# Patient Record
Sex: FEMALE | Race: WHITE | NOT HISPANIC OR LATINO
[De-identification: names, ages, dates, MRNs, and addresses within clinical notes are randomized per-mention and may not be internally consistent; named-entity substitution may affect disease eponyms.]

---

## 2020-01-06 ENCOUNTER — APPOINTMENT (OUTPATIENT)
Dept: SURGERY | Facility: CLINIC | Age: 65
End: 2020-01-06
Payer: COMMERCIAL

## 2020-01-06 PROCEDURE — 99205K: CUSTOM

## 2020-06-05 PROBLEM — Z00.00 ENCOUNTER FOR PREVENTIVE HEALTH EXAMINATION: Status: ACTIVE | Noted: 2020-06-05

## 2020-07-28 ENCOUNTER — APPOINTMENT (OUTPATIENT)
Dept: OTOLARYNGOLOGY | Facility: CLINIC | Age: 65
End: 2020-07-28
Payer: COMMERCIAL

## 2020-07-28 VITALS
TEMPERATURE: 98.2 F | BODY MASS INDEX: 22.13 KG/M2 | RESPIRATION RATE: 16 BRPM | HEIGHT: 67 IN | SYSTOLIC BLOOD PRESSURE: 147 MMHG | HEART RATE: 88 BPM | DIASTOLIC BLOOD PRESSURE: 88 MMHG | WEIGHT: 141 LBS

## 2020-07-28 DIAGNOSIS — Z87.891 PERSONAL HISTORY OF NICOTINE DEPENDENCE: ICD-10-CM

## 2020-07-28 DIAGNOSIS — Z82.49 FAMILY HISTORY OF ISCHEMIC HEART DISEASE AND OTHER DISEASES OF THE CIRCULATORY SYSTEM: ICD-10-CM

## 2020-07-28 DIAGNOSIS — Z86.39 PERSONAL HISTORY OF OTHER ENDOCRINE, NUTRITIONAL AND METABOLIC DISEASE: ICD-10-CM

## 2020-07-28 DIAGNOSIS — Z85.828 PERSONAL HISTORY OF OTHER MALIGNANT NEOPLASM OF SKIN: ICD-10-CM

## 2020-07-28 DIAGNOSIS — Z87.39 PERSONAL HISTORY OF OTHER DISEASES OF THE MUSCULOSKELETAL SYSTEM AND CONNECTIVE TISSUE: ICD-10-CM

## 2020-07-28 DIAGNOSIS — Z80.3 FAMILY HISTORY OF MALIGNANT NEOPLASM OF BREAST: ICD-10-CM

## 2020-07-28 DIAGNOSIS — Z80.41 FAMILY HISTORY OF MALIGNANT NEOPLASM OF OVARY: ICD-10-CM

## 2020-07-28 DIAGNOSIS — Z78.9 OTHER SPECIFIED HEALTH STATUS: ICD-10-CM

## 2020-07-28 PROCEDURE — 99204 OFFICE O/P NEW MOD 45 MIN: CPT

## 2020-07-28 RX ORDER — FLUTICASONE PROPIONATE 50 MCG
SPRAY, SUSPENSION NASAL
Refills: 0 | Status: ACTIVE | COMMUNITY

## 2020-07-28 NOTE — CONSULT LETTER
[Dear  ___] : Dear  [unfilled], [DrAmadou  ___] : Dr. YARBROUGH [DrAmadou ___] : Dr. YARBROUGH [Courtesy Letter:] : I had the pleasure of seeing your patient, [unfilled], in my office today. [Please see my note below.] : Please see my note below. [FreeTextEntry2] : Micha Shirley MD  [Sincerely,] : Sincerely, [FreeTextEntry3] : Dank Sands MD

## 2020-07-28 NOTE — HISTORY OF PRESENT ILLNESS
[Neck Mass] : neck mass [Difficulty Swallowing] : no difficulty swallowing [de-identified] : Mrs. Corbett is 63 yo female who is being referred by Dr. Micha Shirley (PCP) for right parotid mass.\par May she noticed a swelling by her right ear and was given antibiotics with no relief and was referred to Dr. Braulio Robison (otolaryngologist) who did a work up. 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 Intermittent tinnitus, no pain in the ear, no hearing loss noticed.\par Denies recent cough, fever, chill, or changes in taste or smell \par \par First noted by pt in May. [Painful Swallowing] : no painful swallowing [Fever] : no fever

## 2020-07-28 NOTE — PHYSICAL EXAM
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  no palp nodes [Midline] : trachea located in midline position [Normal] : no rashes

## 2020-09-03 ENCOUNTER — OUTPATIENT (OUTPATIENT)
Dept: OUTPATIENT SERVICES | Facility: HOSPITAL | Age: 65
LOS: 1 days | End: 2020-09-03
Payer: COMMERCIAL

## 2020-09-03 VITALS
DIASTOLIC BLOOD PRESSURE: 88 MMHG | RESPIRATION RATE: 16 BRPM | HEIGHT: 66 IN | HEART RATE: 83 BPM | WEIGHT: 145.06 LBS | SYSTOLIC BLOOD PRESSURE: 140 MMHG | TEMPERATURE: 97 F | OXYGEN SATURATION: 98 %

## 2020-09-03 DIAGNOSIS — H43.819 VITREOUS DEGENERATION, UNSPECIFIED EYE: Chronic | ICD-10-CM

## 2020-09-03 DIAGNOSIS — C44.92 SQUAMOUS CELL CARCINOMA OF SKIN, UNSPECIFIED: Chronic | ICD-10-CM

## 2020-09-03 DIAGNOSIS — B02.9 ZOSTER WITHOUT COMPLICATIONS: Chronic | ICD-10-CM

## 2020-09-03 DIAGNOSIS — D22.9 MELANOCYTIC NEVI, UNSPECIFIED: Chronic | ICD-10-CM

## 2020-09-03 DIAGNOSIS — D49.0 NEOPLASM OF UNSPECIFIED BEHAVIOR OF DIGESTIVE SYSTEM: ICD-10-CM

## 2020-09-03 DIAGNOSIS — Z98.890 OTHER SPECIFIED POSTPROCEDURAL STATES: Chronic | ICD-10-CM

## 2020-09-03 LAB
ALBUMIN SERPL ELPH-MCNC: 4.8 G/DL — SIGNIFICANT CHANGE UP (ref 3.3–5)
ALP SERPL-CCNC: 62 U/L — SIGNIFICANT CHANGE UP (ref 40–120)
ALT FLD-CCNC: 17 U/L — SIGNIFICANT CHANGE UP (ref 4–33)
ANION GAP SERPL CALC-SCNC: 16 MMO/L — HIGH (ref 7–14)
AST SERPL-CCNC: 18 U/L — SIGNIFICANT CHANGE UP (ref 4–32)
BILIRUB SERPL-MCNC: 0.4 MG/DL — SIGNIFICANT CHANGE UP (ref 0.2–1.2)
BUN SERPL-MCNC: 15 MG/DL — SIGNIFICANT CHANGE UP (ref 7–23)
CALCIUM SERPL-MCNC: 9.9 MG/DL — SIGNIFICANT CHANGE UP (ref 8.4–10.5)
CHLORIDE SERPL-SCNC: 100 MMOL/L — SIGNIFICANT CHANGE UP (ref 98–107)
CO2 SERPL-SCNC: 24 MMOL/L — SIGNIFICANT CHANGE UP (ref 22–31)
CREAT SERPL-MCNC: 0.71 MG/DL — SIGNIFICANT CHANGE UP (ref 0.5–1.3)
GLUCOSE SERPL-MCNC: 81 MG/DL — SIGNIFICANT CHANGE UP (ref 70–99)
HCT VFR BLD CALC: 41.7 % — SIGNIFICANT CHANGE UP (ref 34.5–45)
HGB BLD-MCNC: 13.5 G/DL — SIGNIFICANT CHANGE UP (ref 11.5–15.5)
MCHC RBC-ENTMCNC: 29.6 PG — SIGNIFICANT CHANGE UP (ref 27–34)
MCHC RBC-ENTMCNC: 32.4 % — SIGNIFICANT CHANGE UP (ref 32–36)
MCV RBC AUTO: 91.4 FL — SIGNIFICANT CHANGE UP (ref 80–100)
NRBC # FLD: 0 K/UL — SIGNIFICANT CHANGE UP (ref 0–0)
PLATELET # BLD AUTO: 249 K/UL — SIGNIFICANT CHANGE UP (ref 150–400)
PMV BLD: 10.8 FL — SIGNIFICANT CHANGE UP (ref 7–13)
POTASSIUM SERPL-MCNC: 4.4 MMOL/L — SIGNIFICANT CHANGE UP (ref 3.5–5.3)
POTASSIUM SERPL-SCNC: 4.4 MMOL/L — SIGNIFICANT CHANGE UP (ref 3.5–5.3)
PROT SERPL-MCNC: 7.7 G/DL — SIGNIFICANT CHANGE UP (ref 6–8.3)
RBC # BLD: 4.56 M/UL — SIGNIFICANT CHANGE UP (ref 3.8–5.2)
RBC # FLD: 13.6 % — SIGNIFICANT CHANGE UP (ref 10.3–14.5)
SODIUM SERPL-SCNC: 140 MMOL/L — SIGNIFICANT CHANGE UP (ref 135–145)
WBC # BLD: 9.88 K/UL — SIGNIFICANT CHANGE UP (ref 3.8–10.5)
WBC # FLD AUTO: 9.88 K/UL — SIGNIFICANT CHANGE UP (ref 3.8–10.5)

## 2020-09-03 PROCEDURE — 93010 ELECTROCARDIOGRAM REPORT: CPT

## 2020-09-03 RX ORDER — SODIUM CHLORIDE 9 MG/ML
1000 INJECTION, SOLUTION INTRAVENOUS
Refills: 0 | Status: DISCONTINUED | OUTPATIENT
Start: 2020-09-11 | End: 2020-09-25

## 2020-09-03 RX ORDER — ALPRAZOLAM 0.25 MG
0 TABLET ORAL
Qty: 0 | Refills: 0 | DISCHARGE

## 2020-09-03 RX ORDER — CHOLECALCIFEROL (VITAMIN D3) 125 MCG
0 CAPSULE ORAL
Qty: 0 | Refills: 0 | DISCHARGE

## 2020-09-03 NOTE — H&P PST ADULT - NEGATIVE GENERAL SYMPTOMS
Patient has not yet had his repeat study done so we will help to expedite that today  no weight loss/no chills/no sweating/no anorexia/no weight gain/no fever

## 2020-09-03 NOTE — H&P PST ADULT - NSICDXPASTSURGICALHX_GEN_ALL_CORE_FT
PAST SURGICAL HISTORY:  Melanocytic nevus Excision Back 2015    S/P left breast biopsy " benign "    Shingles 2012    Squamous cell skin cancer s/p Excision Right Thigh    Vitreous detachment

## 2020-09-03 NOTE — H&P PST ADULT - NSICDXPASTMEDICALHX_GEN_ALL_CORE_FT
PAST MEDICAL HISTORY:  Anxiety     Arthritis Left Hip    History of colitis 40 years ago    Squamous cell skin cancer Right thigh    Vitreous detachment, right 3/18 ; pt denies surgical intervention

## 2020-09-03 NOTE — H&P PST ADULT - NSICDXPROBLEM_GEN_ALL_CORE_FT
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

## 2020-09-03 NOTE — H&P PST ADULT - HISTORY OF PRESENT ILLNESS
Pt is a 65 y.o. female ; pt reports swelling right parotid gland first noted 4/2020. Pt to PCP ; tx with Abx ; Pt is a 65 y.o. female ; pt reports swelling right parotid gland first noted 4/2020. Pt to PCP ; tx with Abx ; pt referred to surgeon :s/p C/T scan ; s/p Biopsy ; pt now presents for Right Parotidectomy

## 2020-09-06 DIAGNOSIS — Z01.818 ENCOUNTER FOR OTHER PREPROCEDURAL EXAMINATION: ICD-10-CM

## 2020-09-08 ENCOUNTER — APPOINTMENT (OUTPATIENT)
Dept: DISASTER EMERGENCY | Facility: CLINIC | Age: 65
End: 2020-09-08

## 2020-09-08 LAB — SARS-COV-2 N GENE NPH QL NAA+PROBE: NOT DETECTED

## 2020-09-10 ENCOUNTER — TRANSCRIPTION ENCOUNTER (OUTPATIENT)
Age: 65
End: 2020-09-10

## 2020-09-10 NOTE — ASU PATIENT PROFILE, ADULT - PSH
Melanocytic nevus  Excision Back 2015  S/P left breast biopsy  " benign "  Shingles  2012  Squamous cell skin cancer  s/p Excision Right Thigh  Vitreous detachment

## 2020-09-10 NOTE — ASU PATIENT PROFILE, ADULT - PT NEEDS ASSIST
Patient did not come to the lab to have test(s) drawn. Orders were canceled placed as future.   1 = assistive equipment no 3 = assistive equipment and person

## 2020-09-11 ENCOUNTER — OUTPATIENT (OUTPATIENT)
Dept: OUTPATIENT SERVICES | Facility: HOSPITAL | Age: 65
LOS: 1 days | Discharge: HOME | End: 2020-09-11
Payer: COMMERCIAL

## 2020-09-11 ENCOUNTER — APPOINTMENT (OUTPATIENT)
Dept: OTOLARYNGOLOGY | Facility: HOSPITAL | Age: 65
End: 2020-09-11

## 2020-09-11 ENCOUNTER — RESULT REVIEW (OUTPATIENT)
Age: 65
End: 2020-09-11

## 2020-09-11 VITALS
RESPIRATION RATE: 18 BRPM | TEMPERATURE: 97 F | SYSTOLIC BLOOD PRESSURE: 145 MMHG | DIASTOLIC BLOOD PRESSURE: 73 MMHG | OXYGEN SATURATION: 97 % | HEART RATE: 78 BPM

## 2020-09-11 VITALS
SYSTOLIC BLOOD PRESSURE: 156 MMHG | WEIGHT: 145.06 LBS | TEMPERATURE: 99 F | HEIGHT: 66 IN | OXYGEN SATURATION: 100 % | RESPIRATION RATE: 16 BRPM | DIASTOLIC BLOOD PRESSURE: 91 MMHG | HEART RATE: 84 BPM

## 2020-09-11 DIAGNOSIS — B02.9 ZOSTER WITHOUT COMPLICATIONS: Chronic | ICD-10-CM

## 2020-09-11 DIAGNOSIS — H43.819 VITREOUS DEGENERATION, UNSPECIFIED EYE: Chronic | ICD-10-CM

## 2020-09-11 DIAGNOSIS — Z98.890 OTHER SPECIFIED POSTPROCEDURAL STATES: Chronic | ICD-10-CM

## 2020-09-11 DIAGNOSIS — C44.92 SQUAMOUS CELL CARCINOMA OF SKIN, UNSPECIFIED: Chronic | ICD-10-CM

## 2020-09-11 DIAGNOSIS — D49.0 NEOPLASM OF UNSPECIFIED BEHAVIOR OF DIGESTIVE SYSTEM: ICD-10-CM

## 2020-09-11 DIAGNOSIS — D22.9 MELANOCYTIC NEVI, UNSPECIFIED: Chronic | ICD-10-CM

## 2020-09-11 PROCEDURE — 88331 PATH CONSLTJ SURG 1 BLK 1SPC: CPT | Mod: 26

## 2020-09-11 PROCEDURE — 42415 EXCISE PAROTID GLAND/LESION: CPT | Mod: GC,RT

## 2020-09-11 PROCEDURE — 88307 TISSUE EXAM BY PATHOLOGIST: CPT | Mod: 26

## 2020-09-11 RX ORDER — FLUTICASONE PROPIONATE 50 MCG
0 SPRAY, SUSPENSION NASAL
Qty: 0 | Refills: 0 | DISCHARGE

## 2020-09-11 RX ORDER — PREGABALIN 225 MG/1
0 CAPSULE ORAL
Qty: 0 | Refills: 0 | DISCHARGE

## 2020-09-11 RX ORDER — ALPRAZOLAM 0.25 MG
0.25 TABLET ORAL
Qty: 0 | Refills: 0 | DISCHARGE

## 2020-09-11 RX ORDER — CEPHALEXIN 500 MG
1 CAPSULE ORAL
Qty: 21 | Refills: 0
Start: 2020-09-11 | End: 2020-09-17

## 2020-09-11 RX ORDER — FENTANYL CITRATE 50 UG/ML
50 INJECTION INTRAVENOUS
Refills: 0 | Status: DISCONTINUED | OUTPATIENT
Start: 2020-09-11 | End: 2020-09-11

## 2020-09-11 RX ORDER — OXYCODONE HYDROCHLORIDE 5 MG/1
1 TABLET ORAL
Qty: 10 | Refills: 0
Start: 2020-09-11

## 2020-09-11 RX ORDER — ONDANSETRON 8 MG/1
4 TABLET, FILM COATED ORAL ONCE
Refills: 0 | Status: DISCONTINUED | OUTPATIENT
Start: 2020-09-11 | End: 2020-09-25

## 2020-09-11 RX ORDER — FENTANYL CITRATE 50 UG/ML
25 INJECTION INTRAVENOUS
Refills: 0 | Status: DISCONTINUED | OUTPATIENT
Start: 2020-09-11 | End: 2020-09-11

## 2020-09-11 RX ORDER — MULTIVIT-MIN/FERROUS GLUCONATE 9 MG/15 ML
1 LIQUID (ML) ORAL
Qty: 0 | Refills: 0 | DISCHARGE

## 2020-09-11 RX ORDER — PSYLLIUM SEED (WITH DEXTROSE)
0 POWDER (GRAM) ORAL
Qty: 0 | Refills: 0 | DISCHARGE

## 2020-09-11 RX ORDER — CHOLECALCIFEROL (VITAMIN D3) 125 MCG
0 CAPSULE ORAL
Qty: 0 | Refills: 0 | DISCHARGE

## 2020-09-11 NOTE — ASU DISCHARGE PLAN (ADULT/PEDIATRIC) - NURSING INSTRUCTIONS
No creams, lotions, powders  or ointments to incision site. Narcotic pain medication may cause nausea or constipation. Take medication with food. Increase fluids and fiber intake. Cool and warm liquids that are not irritating to the throat should be given for the first day or two. Avoid hot liquids. Avoid citrus juices and milk. Advance at your own pace starting with soft foods and advancing to a regular diet. Avoid rough and scratchy foods and foods that are difficult to chew for approximately 5 days. Avoid strenous exercise and blowing of nose. Make appointment with MD.

## 2020-09-11 NOTE — ASU DISCHARGE PLAN (ADULT/PEDIATRIC) - ASU DC SPECIAL INSTRUCTIONSFT
keflex three times a day  oxycodone for pain if OTC pain meds are not enough  Drain SAMINA and record output    We will call you for you for your follow up appointment., Monday or Tuesday

## 2020-09-11 NOTE — ASU DISCHARGE PLAN (ADULT/PEDIATRIC) - CALL YOUR DOCTOR IF YOU HAVE ANY OF THE FOLLOWING:
Unable to urinate/Fever greater than (need to indicate Fahrenheit or Celsius)/Bleeding that does not stop/Pain not relieved by Medications/Nausea and vomiting that does not stop/Swelling that gets worse/Wound/Surgical Site with redness, or foul smelling discharge or pus

## 2020-09-11 NOTE — ASU DISCHARGE PLAN (ADULT/PEDIATRIC) - CARE PROVIDER_API CALL
Dank Sands)  [facility]; Otolaryngology  [address] [place] [address]  Phone: [phone]  Fax: [phone]  Follow Up Time:

## 2020-09-11 NOTE — BRIEF OPERATIVE NOTE - OPERATION/FINDINGS
~3cm parotid mass in deep parotid lobe  FN identified and dissected off the gland without difficulty  Overall benign appearance of mass, no infiltration of surrounding tissue    Frozen path: low grade neoplasm, unable to determine if benign or malignant

## 2020-09-15 ENCOUNTER — APPOINTMENT (OUTPATIENT)
Dept: OTOLARYNGOLOGY | Facility: CLINIC | Age: 65
End: 2020-09-15
Payer: COMMERCIAL

## 2020-09-15 VITALS
TEMPERATURE: 99.1 F | DIASTOLIC BLOOD PRESSURE: 97 MMHG | BODY MASS INDEX: 22.13 KG/M2 | WEIGHT: 141 LBS | HEIGHT: 67 IN | SYSTOLIC BLOOD PRESSURE: 142 MMHG | HEART RATE: 101 BPM

## 2020-09-15 PROCEDURE — 99024 POSTOP FOLLOW-UP VISIT: CPT

## 2020-09-15 NOTE — REASON FOR VISIT
[Post-Operative Visit] : a post-operative visit [FreeTextEntry2] : S/p excision of RIGHT parotid tumor on 9/11/2020

## 2020-09-15 NOTE — HISTORY OF PRESENT ILLNESS
[None] : No associated symptoms are reported. [de-identified] : Mrs. Corbett is a 64 yo female S/p excision of RIGHT parotid tumor on 9/11/2020. Presents today for Moustapha Lowe drain removal. Report over night drain put out less than 10 cc of serosanguineous drain. report feeling well.\par Denies pain, dysphagia, dysphonia and or dyspnea Denies recent cough, fever, chill, or changes in taste or smell

## 2020-09-17 ENCOUNTER — APPOINTMENT (OUTPATIENT)
Dept: OTOLARYNGOLOGY | Facility: CLINIC | Age: 65
End: 2020-09-17
Payer: COMMERCIAL

## 2020-09-17 LAB — SURGICAL PATHOLOGY STUDY: SIGNIFICANT CHANGE UP

## 2020-09-17 PROCEDURE — 99024 POSTOP FOLLOW-UP VISIT: CPT

## 2020-09-17 NOTE — CONSULT LETTER
[Dear  ___] : Dear  [unfilled], [Courtesy Letter:] : I had the pleasure of seeing your patient, [unfilled], in my office today. [Please see my note below.] : Please see my note below. [Sincerely,] : Sincerely, [DrAmadou  ___] : Dr. YARBROUGH [DrAmadou ___] : Dr. YARBROUGH [FreeTextEntry2] : Micha Shirley MD   [FreeTextEntry3] : Renae Oconnell NP\par Dr. Dank Sands\par Head and Neck Surgery\par [facility]\par [address]\[place] [address]\par Tel: [phone]\par \par

## 2020-09-17 NOTE — HISTORY OF PRESENT ILLNESS
[None] : The patient is currently asymptomatic. [de-identified] : Mrs. Corbett is a 64 yo female S/p excision of RIGHT parotid tumor on 9/11/2020. Presents today for surgical incision assessment and suture removal. Reports feeling well. Finishing antibiotic (cephalexin) given when discharged from hospital.Denies pain, dysphagia, dysphonia and or dyspnea Denies recent cough, fever, chill, or changes in taste or smell \par \par final path ce benign process [Difficulty Swallowing] : no difficulty swallowing [Neck Pain] : no neck pain [Fever] : no fever [Painful Swallowing] : no painful swallowing [Hoarseness] : no hoarseness

## 2020-09-17 NOTE — PHYSICAL EXAM
[de-identified] : right neck incision well approximated. Mild redness around incision noted where steri strips were. No discharge , warmth or redness on the incision

## 2020-09-18 PROBLEM — F41.9 ANXIETY DISORDER, UNSPECIFIED: Chronic | Status: ACTIVE | Noted: 2020-09-03

## 2020-09-18 PROBLEM — Z87.19 PERSONAL HISTORY OF OTHER DISEASES OF THE DIGESTIVE SYSTEM: Chronic | Status: ACTIVE | Noted: 2020-09-03

## 2020-09-18 PROBLEM — C44.92 SQUAMOUS CELL CARCINOMA OF SKIN, UNSPECIFIED: Chronic | Status: ACTIVE | Noted: 2020-09-03

## 2020-09-18 PROBLEM — M19.90 UNSPECIFIED OSTEOARTHRITIS, UNSPECIFIED SITE: Chronic | Status: ACTIVE | Noted: 2020-09-03

## 2020-09-18 PROBLEM — H43.811 VITREOUS DEGENERATION, RIGHT EYE: Chronic | Status: ACTIVE | Noted: 2020-09-03

## 2020-10-27 ENCOUNTER — APPOINTMENT (OUTPATIENT)
Dept: OTOLARYNGOLOGY | Facility: CLINIC | Age: 65
End: 2020-10-27
Payer: COMMERCIAL

## 2020-10-27 VITALS
DIASTOLIC BLOOD PRESSURE: 80 MMHG | WEIGHT: 144 LBS | HEART RATE: 80 BPM | SYSTOLIC BLOOD PRESSURE: 136 MMHG | HEIGHT: 67 IN | BODY MASS INDEX: 22.6 KG/M2

## 2020-10-27 PROCEDURE — 99024 POSTOP FOLLOW-UP VISIT: CPT

## 2020-10-27 NOTE — HISTORY OF PRESENT ILLNESS
[de-identified] : 65 year old female follow up 9/11 s/p excision of right parotid tumor. Pathology pleomorphic adenoma. Pt denies pain, numbness at site.

## 2020-12-03 ENCOUNTER — TRANSCRIPTION ENCOUNTER (OUTPATIENT)
Age: 65
End: 2020-12-03

## 2020-12-10 ENCOUNTER — TRANSCRIPTION ENCOUNTER (OUTPATIENT)
Age: 65
End: 2020-12-10

## 2021-01-03 ENCOUNTER — TRANSCRIPTION ENCOUNTER (OUTPATIENT)
Age: 66
End: 2021-01-03

## 2021-07-11 ENCOUNTER — TRANSCRIPTION ENCOUNTER (OUTPATIENT)
Age: 66
End: 2021-07-11

## 2021-07-28 ENCOUNTER — TRANSCRIPTION ENCOUNTER (OUTPATIENT)
Age: 66
End: 2021-07-28

## 2021-11-22 ENCOUNTER — TRANSCRIPTION ENCOUNTER (OUTPATIENT)
Age: 66
End: 2021-11-22

## 2021-11-30 ENCOUNTER — APPOINTMENT (OUTPATIENT)
Dept: OTOLARYNGOLOGY | Facility: CLINIC | Age: 66
End: 2021-11-30
Payer: COMMERCIAL

## 2021-11-30 VITALS
WEIGHT: 154 LBS | DIASTOLIC BLOOD PRESSURE: 86 MMHG | BODY MASS INDEX: 24.17 KG/M2 | HEIGHT: 67 IN | SYSTOLIC BLOOD PRESSURE: 123 MMHG | HEART RATE: 76 BPM

## 2021-11-30 DIAGNOSIS — D49.0 NEOPLASM OF UNSPECIFIED BEHAVIOR OF DIGESTIVE SYSTEM: ICD-10-CM

## 2021-11-30 PROCEDURE — 99213 OFFICE O/P EST LOW 20 MIN: CPT

## 2021-11-30 RX ORDER — ERYTHROMYCIN 5 MG/G
5 OINTMENT OPHTHALMIC
Qty: 3 | Refills: 0 | Status: COMPLETED | COMMUNITY
Start: 2021-07-11

## 2021-11-30 RX ORDER — ALPRAZOLAM 0.25 MG/1
0.25 TABLET ORAL
Qty: 60 | Refills: 0 | Status: ACTIVE | COMMUNITY
Start: 2021-10-07

## 2021-11-30 RX ORDER — METOPROLOL SUCCINATE 25 MG/1
25 TABLET, EXTENDED RELEASE ORAL
Qty: 90 | Refills: 0 | Status: ACTIVE | COMMUNITY
Start: 2021-05-27

## 2021-11-30 NOTE — CONSULT LETTER
[Dear  ___] : Dear  [unfilled], [Courtesy Letter:] : I had the pleasure of seeing your patient, [unfilled], in my office today. [Please see my note below.] : Please see my note below. [Sincerely,] : Sincerely, [DrAmadou  ___] : Dr. YARBROUGH [DrAmadou ___] : Dr. YARBROUGH [FreeTextEntry2] : Micha Shirley MD   [FreeTextEntry3] : Dank Sands MD, FACS\par \par [facility]\par Associate Chair\par Department of Otolaryngology\par Professor\par Otolaryngology & Molecular Medicine\par [employer] [facility]

## 2021-11-30 NOTE — REASON FOR VISIT
[Subsequent Evaluation] : a subsequent evaluation for [FreeTextEntry2] : follow-up s/p excision of RIGHT parotid tumor on 9/11/2020

## 2021-11-30 NOTE — HISTORY OF PRESENT ILLNESS
[None] : The patient is currently asymptomatic. [de-identified] : 66 year female presents for follow-up s/p excision of RIGHT parotid tumor on 9/11/2020. Final path cw benign process-mixed tumor. Reports that she feels like she has to clear her throat frequently, better since surgery. Reports slight numbness around the right ear. Denies pain, dysphagia, odynophagia, dysphonia, dyspnea or otalgia. Denies recent fevers, infections, changes in taste or smell.\par COVID vaccination (Pfizer) 2nd dose completed 05/2021.\par  [Difficulty Swallowing] : no difficulty swallowing [Neck Pain] : no neck pain [Fever] : no fever [Painful Swallowing] : no painful swallowing [Hoarseness] : no hoarseness

## 2021-12-21 ENCOUNTER — TRANSCRIPTION ENCOUNTER (OUTPATIENT)
Age: 66
End: 2021-12-21

## 2022-08-18 ENCOUNTER — NON-APPOINTMENT (OUTPATIENT)
Age: 67
End: 2022-08-18

## 2022-12-02 ENCOUNTER — APPOINTMENT (OUTPATIENT)
Dept: ORTHOPEDIC SURGERY | Facility: CLINIC | Age: 67
End: 2022-12-02

## 2022-12-02 VITALS — WEIGHT: 154 LBS | HEIGHT: 67 IN | BODY MASS INDEX: 24.17 KG/M2

## 2022-12-02 DIAGNOSIS — M25.511 PAIN IN RIGHT SHOULDER: ICD-10-CM

## 2022-12-02 PROCEDURE — 99203 OFFICE O/P NEW LOW 30 MIN: CPT

## 2022-12-02 PROCEDURE — 73030 X-RAY EXAM OF SHOULDER: CPT | Mod: RT

## 2022-12-02 PROCEDURE — 73010 X-RAY EXAM OF SHOULDER BLADE: CPT | Mod: RT

## 2022-12-02 NOTE — PHYSICAL EXAM
[Extension] : extension [Right] : right shoulder [Calcific density] : Calcific density [There are no fractures, subluxations or dislocations. No significant abnormalities are seen] : There are no fractures, subluxations or dislocations. No significant abnormalities are seen [] : negative impingement testing

## 2022-12-02 NOTE — HISTORY OF PRESENT ILLNESS
[Gradual] : gradual [3] : 3 [Dull/Aching] : dull/aching [Localized] : localized [Constant] : constant [Heat] : heat [de-identified] : Having pain behind right shoulder by shoulder blade. Pain is more of an ache, doesn't stop her from any activity. No neck pain or numbness in arm. No night pain [] : no [FreeTextEntry1] : rt shoulder [FreeTextEntry3] : 2 months  [FreeTextEntry5] : pt stated she has been having shoulder pain for two months with nni

## 2023-08-20 ENCOUNTER — NON-APPOINTMENT (OUTPATIENT)
Age: 68
End: 2023-08-20

## 2023-10-24 NOTE — ASU PATIENT PROFILE, ADULT - PMH
left ankle pain sine fall yesterday
Anxiety    Arthritis  Left Hip  History of colitis  40 years ago  Squamous cell skin cancer  Right thigh  Vitreous detachment, right  3/18 ; pt denies surgical intervention

## 2024-01-30 NOTE — H&P PST ADULT - BP NONINVASIVE DIASTOLIC (MM HG)
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 Cheek flap viable. Sutures not ready for removal. Follow up in 3 weeks for suture removal. 89

## 2024-05-06 ENCOUNTER — APPOINTMENT (OUTPATIENT)
Dept: ORTHOPEDIC SURGERY | Facility: CLINIC | Age: 69
End: 2024-05-06
Payer: MEDICARE

## 2024-05-06 VITALS — BODY MASS INDEX: 24.64 KG/M2 | WEIGHT: 157 LBS | HEIGHT: 67 IN

## 2024-05-06 PROCEDURE — 99214 OFFICE O/P EST MOD 30 MIN: CPT

## 2024-05-06 PROCEDURE — 72040 X-RAY EXAM NECK SPINE 2-3 VW: CPT

## 2024-05-06 PROCEDURE — 73030 X-RAY EXAM OF SHOULDER: CPT | Mod: LT

## 2024-05-06 PROCEDURE — 73010 X-RAY EXAM OF SHOULDER BLADE: CPT | Mod: LT

## 2024-05-06 NOTE — IMAGING
[de-identified] : Cervical Spine:  No swelling, no ecchymosis Trapezial and paracervical tenderness to palpation Diminished range of motion in all planes 5/5 deltoid, biceps, triceps and wrist flexors/extensors Negative spurling, negative panda Reflexes +2, intact motor distally NVID  Left shoulder No swelling, no ecchymosis, no pablo deformity Tenderness to palpation over greater tuberosity No instability or tenderness over AC joint Active Forward Flexion 180 Passive FF: 180; ER: 90: IR: 70; ER at the side 50 5/5 supraspinatus, infraspinatus and subscapularis; there is mild pain with strength testing Positive Chávez test, positive impingement sign Speeds and yergason negative Motor and sensory intact distally [Straightening consistent with spasm] : Straightening consistent with spasm [Disc space narrowing] : Disc space narrowing [Left] : left shoulder [There are no fractures, subluxations or dislocations. No significant abnormalities are seen] : There are no fractures, subluxations or dislocations. No significant abnormalities are seen

## 2024-05-06 NOTE — HISTORY OF PRESENT ILLNESS
[1] : 2 [Intermittent] : intermittent [de-identified] : 5.6.24 Patient here for left shoulder pain. Pain started a month ago but subsided. Last week she started working out and then pain started again. Yesterday the neck stared hurting [] : no [FreeTextEntry6] : soreness

## 2024-05-06 NOTE — ASSESSMENT
[FreeTextEntry1] : Exacerbation cervical degenerative disc disease as well as rotator cuff irritation from working out.  She since has resumed working out 2 months ago and thinks this is what triggered it.  No focal neurological deficits on weakness on exam today.  We discussed a comprehensive treatment plan including course of formal physical therapy and activity modification.  Prescription meloxicam sent to the pharmacy.  She will follow-up in 6 weeks time and if still symptomatic we discussed MRI of the neck and or shoulder.  Although expect good outcome prognosis is uncertain at this time if symptoms persist may need further workup.   The patient's current medication management of their orthopedic diagnosis was reviewed today: (1) We discussed a comprehensive treatment plan that included pharmaceutical management involving the use of prescription medications. (2) There is a moderate risk of morbidity with further treatment, especially from use of prescription strength medications and possible side effects of these medications which include upset stomach with oral medications, skin reactions to topical medications and cardiac/renal/diabetes issues with long term use. (3) I recommended that the patient follow-up with their medical physician to discuss any significant specific potential issues with long term medication use such as interactions with current medications or with exacerbation of underlying medical comorbidities. (4) The benefits and risks associated with use of injectable, oral or topical, prescription and over the counter anti-inflammatory medications were discussed with the patient. The patient voiced understanding of the risks including but not limited to bleeding, stroke, kidney dysfunction, heart disease, and were referred to the black box warning label for further information.

## 2024-05-10 RX ORDER — MELOXICAM 15 MG/1
15 TABLET ORAL DAILY
Qty: 30 | Refills: 0 | Status: DISCONTINUED | COMMUNITY
Start: 2024-05-06 | End: 2024-05-10

## 2024-05-10 RX ORDER — METHYLPREDNISOLONE 4 MG/1
4 TABLET ORAL
Qty: 1 | Refills: 0 | Status: ACTIVE | COMMUNITY
Start: 2024-05-10 | End: 1900-01-01

## 2024-06-17 ENCOUNTER — APPOINTMENT (OUTPATIENT)
Dept: ORTHOPEDIC SURGERY | Facility: CLINIC | Age: 69
End: 2024-06-17
Payer: MEDICARE

## 2024-06-17 VITALS — HEIGHT: 67 IN | BODY MASS INDEX: 24.8 KG/M2 | WEIGHT: 158 LBS

## 2024-06-17 DIAGNOSIS — S43.422A SPRAIN OF LEFT ROTATOR CUFF CAPSULE, INITIAL ENCOUNTER: ICD-10-CM

## 2024-06-17 DIAGNOSIS — S13.9XXA SPRAIN OF JOINTS AND LIGAMENTS OF UNSPECIFIED PARTS OF NECK, INITIAL ENCOUNTER: ICD-10-CM

## 2024-06-17 PROCEDURE — 99213 OFFICE O/P EST LOW 20 MIN: CPT

## 2024-06-17 NOTE — IMAGING
[de-identified] : Cervical Spine:   No swelling, no ecchymosis Trapezial and paracervical tenderness to palpation Full ROM without pain  5/5 deltoid, biceps, triceps and wrist flexors/extensors Negative spurling, negative panda Reflexes +2, intact motor distally NVID  Left shoulder  No swelling, no ecchymosis, no pablo deformity Tenderness to palpation over greater tuberosity No instability or tenderness over AC joint Active Forward Flexion 180 Passive FF: 180; ER: 90: IR: 70; ER at the side 50 4/5 supraspinatus, infraspinatus and subscapularis; there is mild pain with strength testing Positive Chávez test, positive impingement sign Speeds and yergason negative Motor and sensory intact distally

## 2024-06-17 NOTE — HISTORY OF PRESENT ILLNESS
[1] : 2 [Intermittent] : intermittent [de-identified] : 5.6.24 Patient here for left shoulder pain. Pain started a month ago but subsided. Last week she started working out and then pain started again. Yesterday the neck stared hurting  6.17.24 Patient here for left shoulder pain. Patient states pain improved since last visit, states physical therapy is working.  [] : no [FreeTextEntry6] : soreness

## 2024-06-17 NOTE — ASSESSMENT
[FreeTextEntry1] : Clinically improving with physical therapy. Residual weakness on the left side, but improved since last visits. Advised to continue with physical therapy. She is not needing meloxicam at this time, but discussed use as needed. RTC 6 weeks, if symptoms still persist will evaluate integrity of rotator cuff with MRI of left shoulder.  Progress note completed by Amarilys Cook PA-C under the supervision of Dr. Pinzon

## 2024-06-20 ENCOUNTER — RESULT REVIEW (OUTPATIENT)
Age: 69
End: 2024-06-20

## 2024-07-22 ENCOUNTER — APPOINTMENT (OUTPATIENT)
Dept: ORTHOPEDIC SURGERY | Facility: CLINIC | Age: 69
End: 2024-07-22
Payer: MEDICARE

## 2024-07-22 DIAGNOSIS — S43.422A SPRAIN OF LEFT ROTATOR CUFF CAPSULE, INITIAL ENCOUNTER: ICD-10-CM

## 2024-07-22 DIAGNOSIS — M54.2 CERVICALGIA: ICD-10-CM

## 2024-07-22 DIAGNOSIS — M75.112 INCOMPLETE ROTATOR CUFF TEAR OR RUPTURE OF LEFT SHOULDER, NOT SPECIFIED AS TRAUMATIC: ICD-10-CM

## 2024-07-22 PROCEDURE — 99213 OFFICE O/P EST LOW 20 MIN: CPT

## 2024-07-22 NOTE — ASSESSMENT
[FreeTextEntry1] : Reviewed left shoulder MRI in detail.  Partial bursal sided tearing relatively small in the background of chronic tendinopathy.  We reviewed these findings in detail.  Mostly degenerative and related to lifting and exercising.  Long discussion about expectations and continued need for physical therapy and eventually transition to home exercise program.  She is not interested in a corticosteroid injection symptoms are mild at this time.  She will follow-up as needed.

## 2024-07-22 NOTE — IMAGING
[de-identified] : Shoulder Exam Inspection: No swelling, no ecchymosis, no pablo deformity Palpation: Tenderness to palpation over greater tuberosity Stability: No instability or tenderness over AC joint Range of Motion: Active Forward Flexion 180 Passive FF: 180; ER: 90: IR: 70; ER at the side 50 Strength: 5/5 supraspinatus, infraspinatus and subscapularis; there is pain with strength testing Special testing: Positive Chávez test, positive impingement sign; Speeds and yergason negative Neuro: Motor and sensory intact distally

## 2024-07-22 NOTE — HISTORY OF PRESENT ILLNESS
[1] : 2 [Intermittent] : intermittent [de-identified] : 5.6.24 Patient here for left shoulder pain. Pain started a month ago but subsided. Last week she started working out and then pain started again. Yesterday the neck stared hurting  6.17.24 Patient here for left shoulder pain. Patient states pain improved since last visit, states physical therapy is working.   7.22.24 Patient here for left shoulder pain. Patient states improvement since last visit. here to review [] : no [FreeTextEntry6] : soreness

## 2024-10-29 ENCOUNTER — NON-APPOINTMENT (OUTPATIENT)
Age: 69
End: 2024-10-29

## 2024-12-28 ENCOUNTER — NON-APPOINTMENT (OUTPATIENT)
Age: 69
End: 2024-12-28